# Patient Record
Sex: MALE | Race: BLACK OR AFRICAN AMERICAN | NOT HISPANIC OR LATINO | Employment: FULL TIME | URBAN - METROPOLITAN AREA
[De-identification: names, ages, dates, MRNs, and addresses within clinical notes are randomized per-mention and may not be internally consistent; named-entity substitution may affect disease eponyms.]

---

## 2017-05-15 ENCOUNTER — HOSPITAL ENCOUNTER (EMERGENCY)
Facility: OTHER | Age: 27
Discharge: HOME | End: 2017-05-15
Attending: EMERGENCY MEDICINE
Payer: MEDICAID

## 2017-05-15 VITALS
OXYGEN SATURATION: 96 % | SYSTOLIC BLOOD PRESSURE: 132 MMHG | HEIGHT: 70 IN | HEART RATE: 89 BPM | BODY MASS INDEX: 23.62 KG/M2 | WEIGHT: 165 LBS | TEMPERATURE: 99 F | DIASTOLIC BLOOD PRESSURE: 64 MMHG | RESPIRATION RATE: 17 BRPM

## 2017-05-15 DIAGNOSIS — R05.9 COUGH: ICD-10-CM

## 2017-05-15 DIAGNOSIS — B34.9 VIRAL SYNDROME: Primary | ICD-10-CM

## 2017-05-15 PROCEDURE — 99283 EMERGENCY DEPT VISIT LOW MDM: CPT

## 2017-05-15 PROCEDURE — 25000003 PHARM REV CODE 250: Performed by: EMERGENCY MEDICINE

## 2017-05-15 RX ORDER — BENZONATATE 100 MG/1
100 CAPSULE ORAL 3 TIMES DAILY PRN
Qty: 12 CAPSULE | Refills: 0 | Status: SHIPPED | OUTPATIENT
Start: 2017-05-15 | End: 2017-05-19

## 2017-05-15 RX ORDER — ACETAMINOPHEN 500 MG
1000 TABLET ORAL
Status: COMPLETED | OUTPATIENT
Start: 2017-05-15 | End: 2017-05-15

## 2017-05-15 RX ADMIN — ACETAMINOPHEN 1000 MG: 500 TABLET ORAL at 08:05

## 2017-05-15 NOTE — ED AVS SNAPSHOT
[facility]  [address]  [place] [address]               Ruel Shields Jr.   5/15/2017  7:45 PM   ED    Description:  Male : 1990   Department:  [facility]           Your Care was Coordinated By:     Provider Role From To    Speedy Chou MD Attending Provider 05/15/17 473 --      Reason for Visit     Cough           Diagnoses this Visit        Comments    Viral syndrome    -  Primary     Cough           ED Disposition     None           To Do List           Follow-up Information     Follow up with [facility] In 2 days.    Contact information:    [address] [place] [address] [phone]         These Medications        Disp Refills Start End    benzonatate (TESSALON) 100 MG capsule 12 capsule 0 5/15/2017 2017    Take 1 capsule (100 mg total) by mouth 3 (three) times daily as needed for Cough. - Oral      [facility] On Call     [facility] Care Line -  Assistance  Unless otherwise directed by your provider, please contact [facility], our nurse care line that is available for  assistance.     Registered nurses in the [facility] provide: appointment scheduling, clinical advisement, health education, and other advisory services.  Call: [phone] (toll free)               Medications           START taking these NEW medications        Refills    benzonatate (TESSALON) 100 MG capsule 0    Sig: Take 1 capsule (100 mg total) by mouth 3 (three) times daily as needed for Cough.    Class: Print    Route: Oral      These medications were administered today        Dose Freq    acetaminophen tablet 1,000 mg 1,000 mg ED 1 Time    Sig: Take 2 tablets (1,000 mg total) by mouth ED 1 Time.    Class: Normal    Route: Oral      STOP taking these medications     loratadine (CLARITIN) 10 mg tablet Take 1 tablet (10 mg total) by mouth once daily.           Verify that the below list of  "medications is an accurate representation of the medications you are currently taking.  If none reported, the list may be blank. If incorrect, please contact your healthcare provider. Carry this list with you in case of emergency.           Current Medications     efavirenz-emtrictabine-tenofovir 600-200-300 mg (ATRIPLA) 600-200-300 mg Tab Take 1 tablet by mouth every evening.    benzonatate (TESSALON) 100 MG capsule Take 1 capsule (100 mg total) by mouth 3 (three) times daily as needed for Cough.    ondansetron (ZOFRAN-ODT) 4 MG TbDL Take 1 tablet (4 mg total) by mouth every 8 (eight) hours as needed (vomiting).           Clinical Reference Information           Your Vitals Were     BP Pulse Temp Resp Height Weight    132/64 (BP Location: Left arm, Patient Position: Sitting) 89 101.4 °F (38.6 °C) (Oral) 17 5' 10" (1.778 m) 74.8 kg (165 lb)    SpO2 BMI             96% 23.68 kg/m2         Allergies as of 5/15/2017     No Known Allergies      Immunizations Administered on Date of Encounter - 5/15/2017     None      ED Micro, Lab, POCT     None      ED Imaging Orders     Start Ordered       Status Ordering Provider    05/15/17 1952 05/15/17 1951  X-Ray Chest PA And Lateral  1 time imaging      Final result         Discharge Instructions         Viral Syndrome (Adult)  A viral illness may cause a number of symptoms. The symptoms depend on the part of the body that the virus affects. If it settles in your nose, throat, and lungs, it may cause cough, sore throat, congestion, and sometimes headache. If it settles in your stomach and intestinal tract, it may cause vomiting and diarrhea. Sometimes it causes vague symptoms like "aching all over," feeling tired, loss of appetite, or fever.  A viral illness usually lasts 1 to 2 weeks, but sometimes it lasts longer. In some cases, a more serious infection can look like a viral syndrome in the first few days of the illness. You may need another exam and additional tests to know " the difference. Watch for the warning signs listed below.  Home care  Follow these guidelines for taking care of yourself at home:  · If symptoms are severe, rest at home for the first 2 to 3 days.  · Stay away from cigarette smoke - both your smoke and the smoke from others.  · You may use over-the-counter acetaminophen or ibuprofen for fever, muscle aching, and headache, unless another medicine was prescribed for this. If you have chronic liver or kidney disease or ever had a stomach ulcer or GI bleeding, talk with your doctor before using these medicines. No one who is younger than 18 and ill with a fever should take aspirin. It may cause severe disease or death.  · Your appetite may be poor, so a light diet is fine. Avoid dehydration by drinking 8 to 12 8-ounce glasses of fluids each day. This may include water; orange juice; lemonade; apple, grape, and cranberry juice; clear fruit drinks; electrolyte replacement and sports drinks; and decaffeinated teas and coffee. If you have been diagnosed with a kidney disease, ask your doctor how much and what types of fluids you should drink to prevent dehydration. If you have kidney disease, drinking too much fluid can cause it build up in the your body and be dangerous to your health.  · Over-the-counter remedies won't shorten the length of the illness but may be helpful for cough, sore throat; and nasal and sinus congestion. Don't use decongestants if you have high blood pressure.  Follow-up care  Follow up with your healthcare provider if you do not improve over the next week.  Call 911  Get emergency medical care if any of the following occur:  · Convulsion  · Feeling weak, dizzy, or like you are going to faint  · Chest pain, shortness of breath, wheezing, or difficulty breathing  When to seek medical advice  Call your healthcare provider right away if any of these occur:  · Cough with lots of colored sputum (mucus) or blood in your sputum  · Chest pain, shortness of  breath, wheezing, or difficulty breathing  · Severe headache; face, neck, or ear pain  · Severe, constant pain in the lower right side of your belly (abdominal)  · Continued vomiting (cant keep liquids down)  · Frequent diarrhea (more than 5 times a day); blood (red or black color) or mucus in diarrhea  · Feeling weak, dizzy, or like you are going to faint  · Extreme thirst  · Fever of 100.4°F (38°C) or higher, or as directed by your healthcare provider  Date Last Reviewed: 9/25/2015  © 9394-5504 [employer]. [address] [place] [address]. All rights reserved. This information is not intended as a substitute for professional medical care. Always follow your healthcare professional's instructions.          Discharge References/Attachments     VIRAL SYNDROME (ADULT) (ENGLISH)      [facility] Sign-Up     Activating your [facility] account is as easy as 1-2-3!     1) Visit [email or web address], select Sign Up Now, enter this activation code and your date of birth, then select Next.  [email or web address]  Expires: 6/29/2017  8:26 PM      2) Create a username and password to use when you visit [facility] in the future and select a security question in case you lose your password and select Next.    3) Enter your e-mail address and click Sign Up!    Additional Information  If you have questions, please e-mail [email or web address] or call [phone] to talk to our [facility] staff. Remember, [facility] is NOT to be used for urgent needs. For medical emergencies, dial 911.          [facility] complies with applicable Federal civil rights laws and does not discriminate on the basis of race, color, national origin, age, disability, or sex.        Language Assistance Services     ATTENTION: Language assistance services are available, free of charge. Please call [phone].      ATENCIÓN: Si habla español, tiene a li disposición servicios gratuitos de asistencia lingüística. Llame al  [phone].     DENNIS Ý: N?u b?n nói Ti?ng Vi?t, có các d?ch v? h? tr? ngôn ng? mi?n phí dành cho b?n. G?i s? [phone].

## 2017-05-16 NOTE — ED PROVIDER NOTES
Encounter Date: 5/15/2017    SCRIBE #1 NOTE: I, Dominik Santillan, am scribing for, and in the presence of,  Dr. Phan. I have scribed the entire note.       History     Chief Complaint   Patient presents with    Cough     pt c/o chills and a cough. started last night. hx HIV     Review of patient's allergies indicates:  No Known Allergies  HPI Comments: Time seen by provider: 7:46 PM    This is a 27 y.o. male with HIV who presents with complaint of fever starting last night. He notes accompanying chills, cough and lack of appetite. He reports no identifying, alleviating, or exacerbating factors.  He denies sore throat, nausea, vomiting, abdominal pain and SOB. His viral load is undetectable and his CD4 count is over 500. He is compliant with his HIV medications. He admits to marijuana use and occasional alcohol use.    The history is provided by the patient.     Past Medical History:   Diagnosis Date    HIV (human immunodeficiency virus infection)      History reviewed. No pertinent surgical history.  History reviewed. No pertinent family history.  Social History   Substance Use Topics    Smoking status: Never Smoker    Smokeless tobacco: None    Alcohol use Yes     Review of Systems   Constitutional: Positive for appetite change, chills and fever. Negative for diaphoresis.   HENT: Negative for congestion and sore throat.    Eyes: Negative for redness.   Respiratory: Positive for cough. Negative for chest tightness, shortness of breath and wheezing.    Cardiovascular: Negative for chest pain, palpitations and leg swelling.   Gastrointestinal: Negative for abdominal pain, diarrhea, nausea and vomiting.   Genitourinary: Negative for difficulty urinating and dysuria.   Musculoskeletal: Negative for back pain and myalgias.   Skin: Negative for color change.   Neurological: Negative for light-headedness and headaches.   Psychiatric/Behavioral: Negative for behavioral problems and confusion.       Physical Exam    Initial Vitals   BP Pulse Resp Temp SpO2   05/15/17 1941 05/15/17 1941 05/15/17 1941 05/15/17 1941 05/15/17 1941   132/64 89 17 98.4 °F (36.9 °C) 96 %     Physical Exam    Nursing note and vitals reviewed.  Constitutional: He appears well-developed and well-nourished. He is not diaphoretic. No distress.   HENT:   Head: Normocephalic and atraumatic.   Mouth/Throat: Oropharynx is clear and moist.   Sticky mucous membranes.    Eyes: Conjunctivae and EOM are normal. Pupils are equal, round, and reactive to light.   Neck: Normal range of motion. Neck supple.   Cardiovascular: Normal rate, regular rhythm, S1 normal, S2 normal, normal heart sounds and intact distal pulses. Exam reveals no gallop and no friction rub.    No murmur heard.  Pulmonary/Chest: Breath sounds normal. No respiratory distress. He has no wheezes. He has no rhonchi. He has no rales.   Clear to ascultation bilaterally     Abdominal: Soft. Bowel sounds are normal. He exhibits no distension. There is no tenderness. There is no rebound and no guarding.   Musculoskeletal: Normal range of motion. He exhibits no edema or tenderness.   No lower extremity edema.    Lymphadenopathy:     He has no cervical adenopathy.   Neurological: He is alert and oriented to person, place, and time. He has normal strength. No sensory deficit.   Skin: Skin is warm and dry. No rash noted. No pallor.   Psychiatric: He has a normal mood and affect. His behavior is normal. Judgment and thought content normal.         ED Course   Procedures  Labs Reviewed - No data to display       X-Rays:   Independently Interpreted Readings:   Chest X-Ray: No acute findings visualized.       Imaging Results         X-Ray Chest PA And Lateral (Final result) Result time:  05/15/17 20:18:50    Final result by Raymond Deutsch MD (05/15/17 20:18:50)    Impression:       No acute process.              Electronically signed by: RAYMOND DEUTSCH MD  Date:     05/15/17  Time:    20:18     Narrative:    Exam:  [id number]  05/15/17  19:53:22 IMG36 ([facility]) : XR CHEST PA AND LATERAL    Technique:    frontal and lateral chest x-ray    Comparison:    05/15/2015    Findings:      The trachea is unremarkable.  The cardiomediastinal silhouette is within normal limits.  The hemidiaphragms are unremarkable.  There is no evidence of free air beneath the hemidiaphragms.  There are no pleural effusions.  There is no evidence of a pneumothorax.  No airspace opacities are present.  The osseous structures are within normal limits.             Medical Decision Making:   Clinical Tests:   Radiological Study: Ordered and Reviewed    Additional MDM:   X-Rays: I have independently interpreted X-Ray(s) - see notes.          Scribe Attestation:   Scribe #1: I performed the above scribed service and the documentation accurately describes the services I performed. I attest to the accuracy of the note.    Attending Attestation:           Physician Attestation for Scribe:  Physician Attestation Statement for Scribe #1: I, Dr. Chou, reviewed documentation, as scribed by Dominik Santillan in my presence, and it is both accurate and complete.         Attending ED Notes:   Emergent evaluation of 27-year-old male with cough, fevers and chills.  No night sweats.  No hemoptysis.  H&H febrile, nontoxic-appearing with stable vital signs.  Lungs are clear to auscultation bilaterally.  Patient in no acute respiratory distress.  Oxygen saturation is 100% on room air.  Patient has a history of HIV and states that his viral load is undetectable and that his CD4 count is greater than 500.  The patient is extensively counseled on his diagnosis and treatment including all diagnostic and physical exam findings.  The patient is discharged in good condition and directed follow-up with his PCP in the next 24-48 hours.          ED Course     Clinical Impression:     1. Viral syndrome    2. Cough             Speedy Chou MD  05/15/17 2028

## 2017-05-16 NOTE — DISCHARGE INSTRUCTIONS
"  Viral Syndrome (Adult)  A viral illness may cause a number of symptoms. The symptoms depend on the part of the body that the virus affects. If it settles in your nose, throat, and lungs, it may cause cough, sore throat, congestion, and sometimes headache. If it settles in your stomach and intestinal tract, it may cause vomiting and diarrhea. Sometimes it causes vague symptoms like "aching all over," feeling tired, loss of appetite, or fever.  A viral illness usually lasts 1 to 2 weeks, but sometimes it lasts longer. In some cases, a more serious infection can look like a viral syndrome in the first few days of the illness. You may need another exam and additional tests to know the difference. Watch for the warning signs listed below.  Home care  Follow these guidelines for taking care of yourself at home:  · If symptoms are severe, rest at home for the first 2 to 3 days.  · Stay away from cigarette smoke - both your smoke and the smoke from others.  · You may use over-the-counter acetaminophen or ibuprofen for fever, muscle aching, and headache, unless another medicine was prescribed for this. If you have chronic liver or kidney disease or ever had a stomach ulcer or GI bleeding, talk with your doctor before using these medicines. No one who is younger than 18 and ill with a fever should take aspirin. It may cause severe disease or death.  · Your appetite may be poor, so a light diet is fine. Avoid dehydration by drinking 8 to 12 8-ounce glasses of fluids each day. This may include water; orange juice; lemonade; apple, grape, and cranberry juice; clear fruit drinks; electrolyte replacement and sports drinks; and decaffeinated teas and coffee. If you have been diagnosed with a kidney disease, ask your doctor how much and what types of fluids you should drink to prevent dehydration. If you have kidney disease, drinking too much fluid can cause it build up in the your body and be dangerous to your " health.  · Over-the-counter remedies won't shorten the length of the illness but may be helpful for cough, sore throat; and nasal and sinus congestion. Don't use decongestants if you have high blood pressure.  Follow-up care  Follow up with your healthcare provider if you do not improve over the next week.  Call 911  Get emergency medical care if any of the following occur:  · Convulsion  · Feeling weak, dizzy, or like you are going to faint  · Chest pain, shortness of breath, wheezing, or difficulty breathing  When to seek medical advice  Call your healthcare provider right away if any of these occur:  · Cough with lots of colored sputum (mucus) or blood in your sputum  · Chest pain, shortness of breath, wheezing, or difficulty breathing  · Severe headache; face, neck, or ear pain  · Severe, constant pain in the lower right side of your belly (abdominal)  · Continued vomiting (cant keep liquids down)  · Frequent diarrhea (more than 5 times a day); blood (red or black color) or mucus in diarrhea  · Feeling weak, dizzy, or like you are going to faint  · Extreme thirst  · Fever of 100.4°F (38°C) or higher, or as directed by your healthcare provider  Date Last Reviewed: 9/25/2015  © 6530-1981 [employer]. [address] [place] [address], [place] [address]. All rights reserved. This information is not intended as a substitute for professional medical care. Always follow your healthcare professional's instructions.

## 2017-05-16 NOTE — ED NOTES
"Pt presents to the ED with c/o "not feeling well." Pt reports he has chills, cough, lack of appetite since Sunday. Pt reports he went out drinking on sat night but still is not feeling well. Pt reports he last CD4 count was >500. Pt appears non toxic and in no signs of acute distress.   "

## 2018-07-28 ENCOUNTER — HOSPITAL ENCOUNTER (EMERGENCY)
Facility: OTHER | Age: 28
Discharge: HOME | End: 2018-07-28
Attending: EMERGENCY MEDICINE
Payer: MEDICAID

## 2018-07-28 VITALS
WEIGHT: 160 LBS | DIASTOLIC BLOOD PRESSURE: 70 MMHG | OXYGEN SATURATION: 98 % | TEMPERATURE: 99 F | HEART RATE: 86 BPM | HEIGHT: 70 IN | RESPIRATION RATE: 16 BRPM | BODY MASS INDEX: 22.9 KG/M2 | SYSTOLIC BLOOD PRESSURE: 118 MMHG

## 2018-07-28 DIAGNOSIS — J02.9 PHARYNGITIS, UNSPECIFIED ETIOLOGY: Primary | ICD-10-CM

## 2018-07-28 PROCEDURE — 63600175 PHARM REV CODE 636 W HCPCS: Performed by: EMERGENCY MEDICINE

## 2018-07-28 PROCEDURE — 96372 THER/PROPH/DIAG INJ SC/IM: CPT

## 2018-07-28 PROCEDURE — 99283 EMERGENCY DEPT VISIT LOW MDM: CPT | Mod: 25

## 2018-07-28 RX ORDER — DEXAMETHASONE SODIUM PHOSPHATE 4 MG/ML
8 INJECTION, SOLUTION INTRA-ARTICULAR; INTRALESIONAL; INTRAMUSCULAR; INTRAVENOUS; SOFT TISSUE
Status: COMPLETED | OUTPATIENT
Start: 2018-07-28 | End: 2018-07-28

## 2018-07-28 RX ADMIN — PENICILLIN G BENZATHINE 1.2 MILLION UNITS: 1200000 INJECTION, SUSPENSION INTRAMUSCULAR at 08:07

## 2018-07-28 RX ADMIN — DEXAMETHASONE SODIUM PHOSPHATE 8 MG: 4 INJECTION, SOLUTION INTRAMUSCULAR; INTRAVENOUS at 08:07

## 2018-07-28 NOTE — ED PROVIDER NOTES
Encounter Date: 7/28/2018    SCRIBE #1 NOTE: I, Roman Ruby, am scribing for, and in the presence of, Dr. Hughes.       History     Chief Complaint   Patient presents with    Sore Throat     sore throat, cough, congestion, and headache since yesterday. last took tylenol yesterday     Time seen by provider: 7:54 AM    This is a 28 y.o. male who presents with complaint of sudden, constant sore throat that began yesterday. He is also endorsing headaches, chills, congestion, sinus pressure, trouble swallowing, decreased appetite, productive cough, and a fever of 100 this morning. He took Tylenol with mild relief. He denies bilateral ear pain, rhinorrhea, SOB, chest pain, abdominal pain, N/V/D, rash, or urinary symptoms. Patient reports history of HIV and is compliant with medications. He states he has an undetectable viral load, but he is unsure of his CD4 count. NKDA.         The history is provided by the patient.     Review of patient's allergies indicates:  No Known Allergies  Past Medical History:   Diagnosis Date    HIV (human immunodeficiency virus infection)      No past surgical history on file.  No family history on file.  Social History   Substance Use Topics    Smoking status: Never Smoker    Smokeless tobacco: Not on file    Alcohol use Yes     Review of Systems   Constitutional: Positive for appetite change, chills and fever.   HENT: Positive for congestion, sinus pressure, sore throat and trouble swallowing. Negative for ear pain and rhinorrhea.    Respiratory: Positive for cough. Negative for shortness of breath.    Cardiovascular: Negative for chest pain.   Gastrointestinal: Negative for abdominal pain, diarrhea, nausea and vomiting.   Genitourinary: Negative for difficulty urinating, dysuria, frequency and urgency.   Musculoskeletal: Negative for back pain.   Skin: Negative for rash.   Neurological: Positive for headaches.   Psychiatric/Behavioral: Negative for confusion.       Physical Exam      Initial Vitals [07/28/18 0725]   BP Pulse Resp Temp SpO2   (!) 148/77 92 18 99 °F (37.2 °C) 96 %      MAP       --         Physical Exam    Nursing note and vitals reviewed.  Constitutional: He appears well-developed and well-nourished. No distress.   Thin male.    HENT:   Head: Normocephalic and atraumatic.   Right TM with clear effusion. Left TM normal. Mildly inflamed turbinates without purulent drainage. Erythema to bilateral tonsillar and peritonsillar areas. Scant exudates present on right tonsil. Tonsils are symmetric without enlargement. Uvula midline. No hoarseness, stridor, or difficulty with secretions.    Eyes: Conjunctivae and EOM are normal. Pupils are equal, round, and reactive to light.   Neck: Normal range of motion. Neck supple.   No meningismus. A few superior anterior cervical nodes present.    Cardiovascular: Normal rate, regular rhythm and normal heart sounds.   Pulmonary/Chest: Breath sounds normal. No respiratory distress.   Musculoskeletal: Normal range of motion.   Neurological: He is alert and oriented to person, place, and time. He has normal strength. No cranial nerve deficit or sensory deficit.   Skin: Skin is warm and dry. No rash noted.   Psychiatric: He has a normal mood and affect. His behavior is normal. Judgment and thought content normal.         ED Course   Procedures  Labs Reviewed - No data to display       Imaging Results    None                     Scribe Attestation:   Scribe #1: I performed the above scribed service and the documentation accurately describes the services I performed. I attest to the accuracy of the note.    Attending Attestation:           Physician Attestation for Scribe:  Physician Attestation Statement for Scribe #1: I, Dr. Hughes, reviewed documentation, as scribed by Roman Ruby  in my presence, and it is both accurate and complete.           Patient with history of HIV, compliance with antiretrovirals and self reported undetectable viral load  presents complaining of sore throat as well as other nonspecific signs of upper respiratory infection.  Reports fever at home of 100, but is afebrile here.  Exam a does appear to have an acute pharyngitis but otherwise well appearing.  Not toxic.  No airway compromise.  Will treat with IM Bicillin and Decadron.  Encouraged continued fluids and home, ibuprofen/Tylenol as needed.  Return precautions discussed.  No further concerns at this time.  Patient understands and is comfortable with plan of care.                 Clinical Impression:     1. Pharyngitis, unspecified etiology                                   Mumtaz Hughes II, MD  07/30/18 1951

## 2018-10-17 ENCOUNTER — HOSPITAL ENCOUNTER (EMERGENCY)
Facility: OTHER | Age: 28
Discharge: HOME | End: 2018-10-17
Attending: EMERGENCY MEDICINE
Payer: MEDICAID

## 2018-10-17 VITALS
OXYGEN SATURATION: 100 % | HEART RATE: 60 BPM | HEIGHT: 69 IN | RESPIRATION RATE: 18 BRPM | WEIGHT: 170 LBS | TEMPERATURE: 99 F | SYSTOLIC BLOOD PRESSURE: 132 MMHG | DIASTOLIC BLOOD PRESSURE: 62 MMHG | BODY MASS INDEX: 25.18 KG/M2

## 2018-10-17 DIAGNOSIS — B34.9 VIRAL SYNDROME: Primary | ICD-10-CM

## 2018-10-17 DIAGNOSIS — R50.9 FEVER: ICD-10-CM

## 2018-10-17 LAB
ALBUMIN SERPL BCP-MCNC: 4 G/DL
ALP SERPL-CCNC: 62 U/L
ALT SERPL W/O P-5'-P-CCNC: 18 U/L
ANION GAP SERPL CALC-SCNC: 12 MMOL/L
AST SERPL-CCNC: 22 U/L
BACTERIA #/AREA URNS HPF: NORMAL /HPF
BASOPHILS # BLD AUTO: 0.05 K/UL
BASOPHILS NFR BLD: 0.3 %
BILIRUB SERPL-MCNC: 2.1 MG/DL
BILIRUB UR QL STRIP: ABNORMAL
BUN SERPL-MCNC: 10 MG/DL
CALCIUM SERPL-MCNC: 9.5 MG/DL
CHLORIDE SERPL-SCNC: 105 MMOL/L
CLARITY UR: CLEAR
CO2 SERPL-SCNC: 20 MMOL/L
COLOR UR: YELLOW
CREAT SERPL-MCNC: 1 MG/DL
DIFFERENTIAL METHOD: ABNORMAL
EOSINOPHIL # BLD AUTO: 0 K/UL
EOSINOPHIL NFR BLD: 0.3 %
ERYTHROCYTE [DISTWIDTH] IN BLOOD BY AUTOMATED COUNT: 12.1 %
EST. GFR  (AFRICAN AMERICAN): >60 ML/MIN/1.73 M^2
EST. GFR  (NON AFRICAN AMERICAN): >60 ML/MIN/1.73 M^2
FLUAV AG SPEC QL IA: NEGATIVE
FLUBV AG SPEC QL IA: NEGATIVE
GLUCOSE SERPL-MCNC: 87 MG/DL
GLUCOSE UR QL STRIP: NEGATIVE
HCT VFR BLD AUTO: 42.2 %
HGB BLD-MCNC: 14.1 G/DL
HGB UR QL STRIP: NEGATIVE
HYALINE CASTS #/AREA URNS LPF: 0 /LPF
KETONES UR QL STRIP: ABNORMAL
LEUKOCYTE ESTERASE UR QL STRIP: NEGATIVE
LYMPHOCYTES # BLD AUTO: 3 K/UL
LYMPHOCYTES NFR BLD: 19.2 %
MCH RBC QN AUTO: 30.9 PG
MCHC RBC AUTO-ENTMCNC: 33.4 G/DL
MCV RBC AUTO: 93 FL
MICROSCOPIC COMMENT: NORMAL
MONOCYTES # BLD AUTO: 1.5 K/UL
MONOCYTES NFR BLD: 9.4 %
NEUTROPHILS # BLD AUTO: 10.9 K/UL
NEUTROPHILS NFR BLD: 70.5 %
NITRITE UR QL STRIP: NEGATIVE
PH UR STRIP: 8 [PH] (ref 5–8)
PLATELET # BLD AUTO: 173 K/UL
PMV BLD AUTO: 9.7 FL
POTASSIUM SERPL-SCNC: 3.8 MMOL/L
PROT SERPL-MCNC: 7.8 G/DL
PROT UR QL STRIP: ABNORMAL
RBC # BLD AUTO: 4.56 M/UL
RBC #/AREA URNS HPF: 3 /HPF (ref 0–4)
SODIUM SERPL-SCNC: 137 MMOL/L
SP GR UR STRIP: 1.02 (ref 1–1.03)
SPECIMEN SOURCE: NORMAL
URN SPEC COLLECT METH UR: ABNORMAL
UROBILINOGEN UR STRIP-ACNC: ABNORMAL EU/DL
WBC # BLD AUTO: 15.4 K/UL
WBC #/AREA URNS HPF: 2 /HPF (ref 0–5)

## 2018-10-17 PROCEDURE — 25000003 PHARM REV CODE 250: Performed by: EMERGENCY MEDICINE

## 2018-10-17 PROCEDURE — 96361 HYDRATE IV INFUSION ADD-ON: CPT

## 2018-10-17 PROCEDURE — 99284 EMERGENCY DEPT VISIT MOD MDM: CPT | Mod: 25

## 2018-10-17 PROCEDURE — 96374 THER/PROPH/DIAG INJ IV PUSH: CPT

## 2018-10-17 PROCEDURE — 81000 URINALYSIS NONAUTO W/SCOPE: CPT

## 2018-10-17 PROCEDURE — 80053 COMPREHEN METABOLIC PANEL: CPT

## 2018-10-17 PROCEDURE — 85025 COMPLETE CBC W/AUTO DIFF WBC: CPT

## 2018-10-17 PROCEDURE — 63600175 PHARM REV CODE 636 W HCPCS: Performed by: EMERGENCY MEDICINE

## 2018-10-17 PROCEDURE — 87400 INFLUENZA A/B EACH AG IA: CPT

## 2018-10-17 RX ORDER — KETOROLAC TROMETHAMINE 30 MG/ML
30 INJECTION, SOLUTION INTRAMUSCULAR; INTRAVENOUS
Status: COMPLETED | OUTPATIENT
Start: 2018-10-17 | End: 2018-10-17

## 2018-10-17 RX ORDER — ACETAMINOPHEN 500 MG
1000 TABLET ORAL
Status: COMPLETED | OUTPATIENT
Start: 2018-10-17 | End: 2018-10-17

## 2018-10-17 RX ADMIN — SODIUM CHLORIDE 1000 ML: 0.9 INJECTION, SOLUTION INTRAVENOUS at 06:10

## 2018-10-17 RX ADMIN — ACETAMINOPHEN 1000 MG: 500 TABLET, FILM COATED ORAL at 04:10

## 2018-10-17 RX ADMIN — KETOROLAC TROMETHAMINE 30 MG: 30 INJECTION, SOLUTION INTRAMUSCULAR at 06:10

## 2018-10-17 NOTE — ED PROVIDER NOTES
Encounter Date: 10/17/2018    SCRIBE #1 NOTE: I, Alfredo Ramos, am scribing for, and in the presence of, Dr. Craft.       History     Chief Complaint   Patient presents with    Emesis     Pt reports vomiting x one episode this am with subjective fever and chills and bodyaches.      Seen by provider: 6:19 PM    Patient is a 28 y.o. male with a history of HIV who presents to the ED with complaint of fever, which began yesterday. He reports associated chills, fatigue, and body aches. He reports taking nyquil last night with minimal relief. This morning he woke up with nausea and had one episode of emesis. He has not vomited since and nausea resolved, but he reports persistent fever, chills, and body aches all day today. He reports drinking a lot of fluids today, but notes a decreased appetite. He denies diarrhea, cough, congestion, sore throat, chest pain, shortness of breath, skin rashes, redness or lesions. Patient states he was seen here two month ago with similar symptoms that resolved with treatment. He states he had an undetectable viral load when last checked and reports compliance with his HIV medications. He has no additional complaints at this time.      The history is provided by the patient.     Review of patient's allergies indicates:  No Known Allergies  Past Medical History:   Diagnosis Date    HIV (human immunodeficiency virus infection)      History reviewed. No pertinent surgical history.  History reviewed. No pertinent family history.  Social History     Tobacco Use    Smoking status: Never Smoker   Substance Use Topics    Alcohol use: Yes    Drug use: Yes     Types: Marijuana     Review of Systems   Constitutional: Positive for appetite change (decreased), chills, fatigue and fever.   HENT: Negative for sore throat.    Respiratory: Negative for shortness of breath.    Cardiovascular: Negative for chest pain.   Gastrointestinal: Positive for vomiting (one episode this morning). Negative for  diarrhea and nausea.   Genitourinary: Negative for dysuria and flank pain.   Musculoskeletal: Positive for myalgias. Negative for back pain.   Skin: Negative for rash.   Neurological: Negative for dizziness, weakness and headaches.   Psychiatric/Behavioral: Negative for confusion.       Physical Exam     Initial Vitals [10/17/18 1631]   BP Pulse Resp Temp SpO2   132/67 88 18 (!) 102.7 °F (39.3 °C) 99 %      MAP       --         Physical Exam    Nursing note and vitals reviewed.  Constitutional: He appears well-developed and well-nourished. He is not diaphoretic. No distress.   HENT:   Head: Normocephalic and atraumatic.   Mouth/Throat: Oropharynx is clear and moist.   Eyes: Conjunctivae and EOM are normal.   Neck: Normal range of motion. Neck supple.   Cardiovascular: Normal rate, regular rhythm and normal heart sounds.   Pulmonary/Chest: Breath sounds normal. No respiratory distress. He has no wheezes. He has no rales.   Abdominal: Soft. He exhibits no distension. There is no tenderness. There is no rebound and no guarding.   Musculoskeletal: Normal range of motion. He exhibits no edema.   Neurological: He is alert and oriented to person, place, and time.   Skin: Skin is warm and dry.   Psychiatric: He has a normal mood and affect. His behavior is normal. Thought content normal.         ED Course   Procedures  Labs Reviewed   CBC W/ AUTO DIFFERENTIAL - Abnormal; Notable for the following components:       Result Value    WBC 15.40 (*)     RBC 4.56 (*)     Gran # (ANC) 10.9 (*)     Mono # 1.5 (*)     All other components within normal limits   COMPREHENSIVE METABOLIC PANEL - Abnormal; Notable for the following components:    CO2 20 (*)     Total Bilirubin 2.1 (*)     All other components within normal limits   URINALYSIS - Abnormal; Notable for the following components:    Protein, UA 1+ (*)     Ketones, UA 2+ (*)     Bilirubin (UA) 1+ (*)     Urobilinogen, UA 4.0-6.0 (*)     All other components within normal  "limits   INFLUENZA A AND B ANTIGEN   URINALYSIS MICROSCOPIC          Imaging Results          X-Ray Chest PA And Lateral (Final result)  Result time 10/17/18 19:13:01    Final result by Toy Vuong MD (10/17/18 19:13:01)                 Impression:      No acute cardiopulmonary finding.      Electronically signed by: Toy Vuong MD  Date:    10/17/2018  Time:    19:13             Narrative:    EXAMINATION:  XR CHEST PA AND LATERAL    CLINICAL HISTORY:  Provided history is "  Fever, unspecified".    TECHNIQUE:  Frontal and lateral views of the chest were performed.    COMPARISON:  05/15/2017.    FINDINGS:  Cardiac silhouette is not enlarged. No focal consolidation.  No sizable pleural effusion.  No pneumothorax.  No detrimental change.                              X-Rays:   Independently Interpreted Readings:   Chest X-Ray: Trachea midline. No cardiomegaly. No effusion, edema or pneumothorax.       Medical Decision Making:   History:   Old Medical Records: I decided to obtain old medical records.  Old Records Summarized: other records.  Initial Assessment:   6:19PM:  Patient is a 28-year-old male who presents to the emergency department with fever, body aches, myalgias.  Patient appears well, nontoxic.  He did have a fever upon initial arrival, but now has resolved.  No clear source of infection at this time, though the patient likely has a viral syndrome.  Given his HIV status, will plan for labs, IV fluids, chest x-ray, labs, will continue to follow and reassess.  Independently Interpreted Test(s):   I have ordered and independently interpreted X-rays - see prior notes.  Clinical Tests:   Lab Tests: Ordered and Reviewed  Radiological Study: Ordered and Reviewed    7:47PM:  Patient doing well.  He is feeling better.  His labs are otherwise unremarkable.  His chest x-ray is negative and his flu is negative. Patient likely has a viral syndrome.  I updated the patient regarding results and I counseled the " patient regarding supportive care measures.  I have discussed with the pt ED return warnings and need for close PCP f/u.  Pt agreeable to plan and all questions answered.  I feel that pt is stable for discharge and management as an outpatient and no further intervention is needed at this time.  Pt is comfortable returning to the ED if needed.  Will DC home in stable condition.                Scribe Attestation:   Scribe #1: I performed the above scribed service and the documentation accurately describes the services I performed. I attest to the accuracy of the note.    Attending Attestation:           Physician Attestation for Scribe:  Physician Attestation Statement for Scribe #1: I, Dr. Craft, reviewed documentation, as scribed by Alfredo Ramos in my presence, and it is both accurate and complete.                    Clinical Impression:     1. Viral syndrome    2. Fever                                Margret Craft MD  10/17/18 5449

## 2018-10-17 NOTE — ED TRIAGE NOTES
Generalized body aches,fever, nausea, vomiting x1 yesterday. Pt was seen at the ED in August for similar symptoms. Pt denies sob, cp, abdominal pain, dysuria, sore throat.

## 2018-10-18 ENCOUNTER — HOSPITAL ENCOUNTER (EMERGENCY)
Facility: OTHER | Age: 28
Discharge: HOME | End: 2018-10-18
Attending: EMERGENCY MEDICINE
Payer: MEDICAID

## 2018-10-18 VITALS
WEIGHT: 170 LBS | BODY MASS INDEX: 25.18 KG/M2 | TEMPERATURE: 99 F | OXYGEN SATURATION: 97 % | SYSTOLIC BLOOD PRESSURE: 126 MMHG | DIASTOLIC BLOOD PRESSURE: 63 MMHG | HEART RATE: 70 BPM | HEIGHT: 69 IN | RESPIRATION RATE: 18 BRPM

## 2018-10-18 DIAGNOSIS — E86.0 DEHYDRATION: ICD-10-CM

## 2018-10-18 DIAGNOSIS — R11.2 NAUSEA AND VOMITING, INTRACTABILITY OF VOMITING NOT SPECIFIED, UNSPECIFIED VOMITING TYPE: ICD-10-CM

## 2018-10-18 DIAGNOSIS — B34.9 VIRAL SYNDROME: Primary | ICD-10-CM

## 2018-10-18 PROCEDURE — 99284 EMERGENCY DEPT VISIT MOD MDM: CPT | Mod: 25

## 2018-10-18 PROCEDURE — 63600175 PHARM REV CODE 636 W HCPCS: Performed by: EMERGENCY MEDICINE

## 2018-10-18 PROCEDURE — 96375 TX/PRO/DX INJ NEW DRUG ADDON: CPT

## 2018-10-18 PROCEDURE — 96361 HYDRATE IV INFUSION ADD-ON: CPT

## 2018-10-18 PROCEDURE — 96374 THER/PROPH/DIAG INJ IV PUSH: CPT

## 2018-10-18 PROCEDURE — 25000003 PHARM REV CODE 250: Performed by: EMERGENCY MEDICINE

## 2018-10-18 RX ORDER — ONDANSETRON 4 MG/1
4 TABLET, ORALLY DISINTEGRATING ORAL EVERY 8 HOURS PRN
Qty: 12 TABLET | Refills: 0 | Status: SHIPPED | OUTPATIENT
Start: 2018-10-18 | End: 2019-11-03

## 2018-10-18 RX ORDER — ONDANSETRON 2 MG/ML
4 INJECTION INTRAMUSCULAR; INTRAVENOUS
Status: COMPLETED | OUTPATIENT
Start: 2018-10-18 | End: 2018-10-18

## 2018-10-18 RX ORDER — IBUPROFEN 600 MG/1
600 TABLET ORAL EVERY 6 HOURS PRN
Qty: 9 TABLET | Refills: 0 | Status: SHIPPED | OUTPATIENT
Start: 2018-10-18 | End: 2019-11-03

## 2018-10-18 RX ORDER — KETOROLAC TROMETHAMINE 30 MG/ML
15 INJECTION, SOLUTION INTRAMUSCULAR; INTRAVENOUS
Status: COMPLETED | OUTPATIENT
Start: 2018-10-18 | End: 2018-10-18

## 2018-10-18 RX ORDER — ACETAMINOPHEN 500 MG
1000 TABLET ORAL
Status: COMPLETED | OUTPATIENT
Start: 2018-10-18 | End: 2018-10-18

## 2018-10-18 RX ADMIN — ACETAMINOPHEN 1000 MG: 500 TABLET ORAL at 08:10

## 2018-10-18 RX ADMIN — SODIUM CHLORIDE 1000 ML: 0.9 INJECTION, SOLUTION INTRAVENOUS at 08:10

## 2018-10-18 RX ADMIN — SODIUM CHLORIDE 1000 ML: 0.9 INJECTION, SOLUTION INTRAVENOUS at 09:10

## 2018-10-18 RX ADMIN — ONDANSETRON 4 MG: 2 INJECTION INTRAMUSCULAR; INTRAVENOUS at 08:10

## 2018-10-18 RX ADMIN — KETOROLAC TROMETHAMINE 15 MG: 30 INJECTION, SOLUTION INTRAMUSCULAR at 09:10

## 2018-10-18 NOTE — ED TRIAGE NOTES
"Pt Presents to ED  C/O N/V, fever, body aches, chills, H/A, diarrhea, weakness x 3 days. Pt states "I came to the ER yesterday but I still feel so bad". Pt denies chest pain, SOB,dizziness. Pt AAO x 4, Pt appears calm and cooperative  "

## 2018-10-18 NOTE — ED PROVIDER NOTES
Encounter Date: 10/18/2018    SCRIBE #1 NOTE: I, Yamile Sharma, am scribing for, and in the presence of, Dr. Phan.       History     Chief Complaint   Patient presents with    Vomiting     pt to er with c/o contiued vomiting today.      Time seen by provider: 8:33 AM    This is a 28 y.o. male with history of HIV who presents with complaint of vomiting that began two days ago. He was seen here yesterday with similar symptoms. He had two episodes of vomiting last night. He has been unable to keep down any food or fluids for the past two days. He reports fever, diaphoresis, nausea, diarrhea, and myalgias. He denies sore throat, cough, or abdominal pain. He reports his viral load was undetectable and CD4 > 800 in August. He is compliant with his medications.      The history is provided by the patient.     Review of patient's allergies indicates:  No Known Allergies  Past Medical History:   Diagnosis Date    HIV (human immunodeficiency virus infection)      History reviewed. No pertinent surgical history.  History reviewed. No pertinent family history.  Social History     Tobacco Use    Smoking status: Never Smoker    Smokeless tobacco: Never Used   Substance Use Topics    Alcohol use: Yes     Comment: social    Drug use: Yes     Types: Marijuana     Review of Systems   Constitutional: Positive for diaphoresis and fever. Negative for chills.   HENT: Negative for congestion, rhinorrhea and sore throat.    Eyes: Negative for photophobia and redness.   Respiratory: Negative for cough and shortness of breath.    Cardiovascular: Negative for chest pain.   Gastrointestinal: Positive for diarrhea, nausea and vomiting. Negative for abdominal distention, abdominal pain, blood in stool and constipation.   Genitourinary: Negative for dysuria.   Musculoskeletal: Positive for myalgias. Negative for back pain.   Skin: Negative for rash.   Neurological: Negative for weakness, light-headedness and headaches.    Psychiatric/Behavioral: Negative for confusion.       Physical Exam     Initial Vitals [10/18/18 0745]   BP Pulse Resp Temp SpO2   136/76 98 18 (!) 101.8 °F (38.8 °C) 98 %      MAP       --         Physical Exam    Nursing note and vitals reviewed.  Constitutional: He appears well-developed and well-nourished. He is not diaphoretic. No distress.   HENT:   Head: Normocephalic and atraumatic.   Eyes: EOM are normal. No scleral icterus.   Neck: Normal range of motion. Neck supple.   Cardiovascular: Normal rate, regular rhythm and normal heart sounds. Exam reveals no gallop and no friction rub.    No murmur heard.  Pulmonary/Chest: Breath sounds normal. No respiratory distress. He has no wheezes. He has no rhonchi. He has no rales.   Abdominal: Soft. There is no tenderness. There is no rebound and no guarding.   Musculoskeletal: Normal range of motion. He exhibits no edema or tenderness.   Neurological: He is alert and oriented to person, place, and time.   Skin: Skin is warm and dry.         ED Course   Procedures  Labs Reviewed - No data to display       Imaging Results    None                     Scribe Attestation:   Scribe #1: I performed the above scribed service and the documentation accurately describes the services I performed. I attest to the accuracy of the note.    Attending Attestation:           Physician Attestation for Scribe:  Physician Attestation Statement for Scribe #1: I, Dr. Phan, reviewed documentation, as scribed by Yamile Sharma in my presence, and it is both accurate and complete.         Attending ED Notes:   Emergent evaluation of a 28-year-old male who presents to the emergency department with chief complaint of generalized body aches, fevers, chills, cough and vomiting. Patient states he feels dehydrated.  Patient was seen yesterday and had a full workup and evaluation which I reviewed in the medical record.  Nausea vomiting is controlled with IV Zofran.  Patient is administered 2 L of  normal saline IV.  The patient is extensively counseled on his diagnosis and treatment, discharged good condition and directed to follow up with his PCP in the next 24-48 hours.    No patient states that his viral load is undetectable and that his CD4 count is greater than 800.             Clinical Impression:     1. Viral syndrome    2. Dehydration    3. Nausea and vomiting, intractability of vomiting not specified, unspecified vomiting type                                 Speedy Chou MD  10/18/18 4855

## 2018-10-18 NOTE — ED NOTES
Dr. Chou informed pt states pain 10/10 generalize body aches. No new orders received. Will continue to monitor.

## 2019-11-03 ENCOUNTER — HOSPITAL ENCOUNTER (EMERGENCY)
Facility: OTHER | Age: 29
Discharge: HOME | End: 2019-11-03
Attending: EMERGENCY MEDICINE
Payer: COMMERCIAL

## 2019-11-03 VITALS
RESPIRATION RATE: 18 BRPM | DIASTOLIC BLOOD PRESSURE: 73 MMHG | BODY MASS INDEX: 29.09 KG/M2 | SYSTOLIC BLOOD PRESSURE: 157 MMHG | TEMPERATURE: 98 F | HEART RATE: 82 BPM | OXYGEN SATURATION: 96 % | WEIGHT: 196.44 LBS | HEIGHT: 69 IN

## 2019-11-03 DIAGNOSIS — H10.13 ALLERGIC CONJUNCTIVITIS, BILATERAL: Primary | ICD-10-CM

## 2019-11-03 PROCEDURE — 99283 EMERGENCY DEPT VISIT LOW MDM: CPT

## 2019-11-03 RX ORDER — ERYTHROMYCIN 5 MG/G
OINTMENT OPHTHALMIC
Qty: 1 TUBE | Refills: 0 | Status: SHIPPED | OUTPATIENT
Start: 2019-11-03 | End: 2019-11-26

## 2019-11-03 NOTE — ED TRIAGE NOTES
Patient presents to ER with c/o bilateral eye redness, itching and drainage since Friday.  Patient also reports some blurry vision.

## 2019-11-04 NOTE — ED PROVIDER NOTES
Encounter Date: 11/3/2019       History     Chief Complaint   Patient presents with    Conjunctivitis     woke up on Friday with bilateral eye itching and redness     Patient is a 29-year-old male with HIV (viral load undetectable) presents to the emergency department with eye redness and irritation.  Patient states he has been crying frequently due to a family member being sick.  He states his left eye became red 2 days ago.  He states he is having I crusting in his eyes are crusted shut in the morning.  He reports applying Visine drops ankle compresses.  He denies loss of vision or trauma.  He reports intermittent floaters.  He is not were contact lenses.  He reports mild photophobia.  He denies foreign body sensation.    The history is provided by the patient.     Review of patient's allergies indicates:  No Known Allergies  Past Medical History:   Diagnosis Date    HIV (human immunodeficiency virus infection)      History reviewed. No pertinent surgical history.  History reviewed. No pertinent family history.  Social History     Tobacco Use    Smoking status: Never Smoker    Smokeless tobacco: Never Used   Substance Use Topics    Alcohol use: Yes     Comment: social    Drug use: Yes     Types: Marijuana     Review of Systems   Constitutional: Negative for chills and fever.   HENT: Negative for congestion and sore throat.    Eyes: Positive for photophobia, discharge, redness and itching.   Skin: Negative for rash and wound.   Neurological: Negative for headaches.       Physical Exam     Initial Vitals [11/03/19 1744]   BP Pulse Resp Temp SpO2   (!) 157/73 82 18 97.7 °F (36.5 °C) 96 %      MAP       --         Physical Exam    Constitutional: Vital signs are normal. He is cooperative. No distress.   HENT:   Head: Normocephalic and atraumatic.   Eyes: EOM and lids are normal. Pupils are equal, round, and reactive to light. Right conjunctiva is injected. Left conjunctiva is injected.   Neck: Normal range of  motion. Neck supple.   Pulmonary/Chest: No respiratory distress.   Neurological: He is alert and oriented to person, place, and time. GCS eye subscore is 4. GCS verbal subscore is 5. GCS motor subscore is 6.   Skin: Skin is warm and dry. No rash noted.   Psychiatric: He has a normal mood and affect. His behavior is normal.         ED Course   Procedures  Labs Reviewed - No data to display       Imaging Results    None          Medical Decision Making:   Initial Assessment:   Urgent evaluation of a 29 y.o. male presenting to the emergency department complaining of bilateral eye redness, crustiness and drainage. Patient is afebrile, nontoxic appearing and hemodynamically stable.  Patient likely has viral/ allergic conjunctivitis. Will cover with abx ointment. Pt advised to f/u with ophthalmology if symptoms persist. No VA deficit.                         Clinical Impression:     1. Allergic conjunctivitis, bilateral                               Ellis Mcdonough PA-C  11/03/19 1826

## 2019-11-26 ENCOUNTER — HOSPITAL ENCOUNTER (EMERGENCY)
Facility: OTHER | Age: 29
Discharge: HOME | End: 2019-11-26
Attending: EMERGENCY MEDICINE
Payer: COMMERCIAL

## 2019-11-26 VITALS
BODY MASS INDEX: 28.14 KG/M2 | OXYGEN SATURATION: 96 % | DIASTOLIC BLOOD PRESSURE: 70 MMHG | RESPIRATION RATE: 16 BRPM | WEIGHT: 190 LBS | TEMPERATURE: 99 F | HEIGHT: 69 IN | SYSTOLIC BLOOD PRESSURE: 136 MMHG | HEART RATE: 95 BPM

## 2019-11-26 DIAGNOSIS — B34.9 VIRAL SYNDROME: Primary | ICD-10-CM

## 2019-11-26 DIAGNOSIS — R05.9 COUGH: ICD-10-CM

## 2019-11-26 LAB
INFLUENZA A, MOLECULAR: NEGATIVE
INFLUENZA B, MOLECULAR: NEGATIVE
SPECIMEN SOURCE: NORMAL

## 2019-11-26 PROCEDURE — 25000242 PHARM REV CODE 250 ALT 637 W/ HCPCS: Performed by: PHYSICIAN ASSISTANT

## 2019-11-26 PROCEDURE — 25000003 PHARM REV CODE 250: Performed by: PHYSICIAN ASSISTANT

## 2019-11-26 PROCEDURE — 94640 AIRWAY INHALATION TREATMENT: CPT

## 2019-11-26 PROCEDURE — 87502 INFLUENZA DNA AMP PROBE: CPT

## 2019-11-26 PROCEDURE — 99283 EMERGENCY DEPT VISIT LOW MDM: CPT | Mod: 25

## 2019-11-26 RX ORDER — AZITHROMYCIN 250 MG/1
250 TABLET, FILM COATED ORAL DAILY
Qty: 6 TABLET | Refills: 0 | Status: SHIPPED | OUTPATIENT
Start: 2019-11-26

## 2019-11-26 RX ORDER — ALBUTEROL SULFATE 0.83 MG/ML
2.5 SOLUTION RESPIRATORY (INHALATION)
Status: COMPLETED | OUTPATIENT
Start: 2019-11-26 | End: 2019-11-26

## 2019-11-26 RX ORDER — IBUPROFEN 400 MG/1
800 TABLET ORAL
Status: COMPLETED | OUTPATIENT
Start: 2019-11-26 | End: 2019-11-26

## 2019-11-26 RX ADMIN — IBUPROFEN 800 MG: 400 TABLET, FILM COATED ORAL at 09:11

## 2019-11-26 RX ADMIN — ALBUTEROL SULFATE 2.5 MG: 2.5 SOLUTION RESPIRATORY (INHALATION) at 10:11

## 2019-11-27 NOTE — ED TRIAGE NOTES
Pt presents to ED with c/o cough and sore throat x a few days. Reports rhinorrhea and coughing up yellowish-green sputum. Reports a sore throat with fever and chills. States he has had a few episodes of diarrhea today. Denies chest pain, SOB, dysuria, and vomiting

## 2019-11-27 NOTE — ED PROVIDER NOTES
Encounter Date: 11/26/2019       History     Chief Complaint   Patient presents with    URI     pt with cough, congestion, fever, chills x yesterday.      Patient is a 29-year-old male with history of HIV who presents to the emergency department with cough and fever.  Patient states over the last 24 hr he has had body aches and chills. He reports congestion and rhinorrhea.  He reports cough with sputum production.  He denies chest pain or shortness of breath. He states he is compliant with all HIV medications and sees his infectious disease doctor regularly.  He states his last CD4 count was very high with an undetectable viral load.  He denies any neck stiffness.  He denies any other associated symptoms.    The history is provided by the patient.     Review of patient's allergies indicates:  No Known Allergies  Past Medical History:   Diagnosis Date    HIV (human immunodeficiency virus infection)      History reviewed. No pertinent surgical history.  History reviewed. No pertinent family history.  Social History     Tobacco Use    Smoking status: Never Smoker    Smokeless tobacco: Never Used   Substance Use Topics    Alcohol use: Yes     Comment: social    Drug use: Yes     Types: Marijuana     Review of Systems   Constitutional: Positive for chills, fatigue and fever. Negative for activity change and appetite change.   HENT: Positive for congestion, postnasal drip and rhinorrhea. Negative for ear discharge, ear pain, sore throat and trouble swallowing.    Respiratory: Positive for cough.    Cardiovascular: Negative for chest pain.   Gastrointestinal: Negative for abdominal pain, blood in stool, constipation, diarrhea, nausea and vomiting.   Genitourinary: Negative for dysuria, flank pain and hematuria.   Musculoskeletal: Positive for myalgias. Negative for back pain, neck pain and neck stiffness.   Skin: Negative for rash and wound.   Neurological: Negative for dizziness, weakness, light-headedness and  headaches.       Physical Exam     Initial Vitals [11/26/19 2043]   BP Pulse Resp Temp SpO2   133/75 88 16 (!) 100.8 °F (38.2 °C) 96 %      MAP       --         Physical Exam    Nursing note and vitals reviewed.  Constitutional: He appears well-developed and well-nourished. He is not diaphoretic.  Non-toxic appearance. No distress.   HENT:   Head: Normocephalic.   Right Ear: Hearing and external ear normal.   Left Ear: Hearing and external ear normal.   Nose: Nose normal.   Mouth/Throat: Uvula is midline, oropharynx is clear and moist and mucous membranes are normal. No oropharyngeal exudate.   Eyes: Conjunctivae are normal. Pupils are equal, round, and reactive to light.   Neck: Normal range of motion and full passive range of motion without pain. Neck supple. Normal range of motion present. No neck rigidity.   Cardiovascular: Normal rate and regular rhythm.   Pulmonary/Chest: No respiratory distress. He has wheezes ( bilateral expiratory). He has no rhonchi. He has no rales. He exhibits no tenderness.   Abdominal: Soft. Bowel sounds are normal. There is no tenderness.   Lymphadenopathy:     He has no cervical adenopathy.   Neurological: He is alert and oriented to person, place, and time.   Skin: Skin is warm and dry. Capillary refill takes less than 2 seconds.   Psychiatric: He has a normal mood and affect.         ED Course   Procedures  Labs Reviewed   INFLUENZA A & B BY MOLECULAR          Imaging Results          X-Ray Chest PA And Lateral (In process)  Result time 11/26/19 22:34:22                 Medical Decision Making:   Initial Assessment:   Urgent evaluation of a 29-year-old male with history of HIV who is compliant with all HIV medication and not immunocompromised at this time who presents to the emergency department with fever and cough.  Patient is afebrile on initial exam.  He is nontoxic appearing and hemodynamically stable. On lung auscultation, there is bilateral expiratory wheezing.  Nasal  mucosal edema. No nuchal rigidity.  Benign abdominal exam.  I suspect viral type syndrome.  Rapid flu will be obtained.  Chest x-ray will be obtained. Patient will be given ibuprofen for fever.  Clinical Tests:   Radiological Study: Ordered and Reviewed  ED Management:  10:39 PM  Chest x-ray reveals no acute cardiopulmonary process.  Breathing treatments is pending.    11:21 PM  Patient feels much better after breathing treatment.  Patient will be discharged with a Z-Gianni and advised to take if symptoms persist for more than 5 days.  Advised to follow up with PCP in this week for reevaluation or return to eD with any worsening symptoms or concerns.  Case discussed in depth with supervising physician who agrees with plan.                                 Clinical Impression:       ICD-10-CM ICD-9-CM   1. Viral syndrome B34.9 079.99   2. Cough R05 786.2                             Leslie De La Vega PA-C  11/26/19 2323       Leslie De La Vega PA-C  11/26/19 4482

## 2023-04-06 NOTE — ED NOTES
ROUNDING:  Lying on the stretcher with HOB elevated. AAOx4. States pain HA and generalize body aches 9/10. C/o nausea. Pt did not vomit po meds. Denies any other discomfort at this time. Skin is warm and dry. Resp:18 even and unlabored. Comfort and BR needs addressed. Plan of care updated. NADN. Bed locked in low position, side rails up x2 and call light within reach. Family at BS. Will continue to monitor.   decreased strength

## 2023-10-27 NOTE — DISCHARGE INSTRUCTIONS
Take tylenol and/or ibuprofen as needed for fever and/or body aches.  Drink plenty of fluids.    Please return to the ER if you have chest pain, difficulty breathing, fevers, altered mental status, dizziness, weakness, or any other concerns.      Follow up with your primary care physician.       Resident Resident Fellow/Student